# Patient Record
Sex: MALE | Race: WHITE | Employment: OTHER | URBAN - NONMETROPOLITAN AREA
[De-identification: names, ages, dates, MRNs, and addresses within clinical notes are randomized per-mention and may not be internally consistent; named-entity substitution may affect disease eponyms.]

---

## 2017-02-01 ENCOUNTER — DOCTOR'S OFFICE (OUTPATIENT)
Dept: URBAN - NONMETROPOLITAN AREA CLINIC 1 | Facility: CLINIC | Age: 67
Setting detail: OPHTHALMOLOGY
End: 2017-02-01
Payer: MEDICARE

## 2017-02-01 DIAGNOSIS — H43.812: ICD-10-CM

## 2017-02-01 DIAGNOSIS — H40.053: ICD-10-CM

## 2017-02-01 DIAGNOSIS — H26.491: ICD-10-CM

## 2017-02-01 DIAGNOSIS — Z96.1: ICD-10-CM

## 2017-02-01 DIAGNOSIS — H35.3131: ICD-10-CM

## 2017-02-01 DIAGNOSIS — H04.123: ICD-10-CM

## 2017-02-01 PROCEDURE — 92133 CPTRZD OPH DX IMG PST SGM ON: CPT | Performed by: OPHTHALMOLOGY

## 2017-02-01 PROCEDURE — 92025 CPTRIZED CORNEAL TOPOGRAPHY: CPT | Performed by: OPHTHALMOLOGY

## 2017-02-01 PROCEDURE — 92014 COMPRE OPH EXAM EST PT 1/>: CPT | Performed by: OPHTHALMOLOGY

## 2017-02-01 PROCEDURE — 76514 ECHO EXAM OF EYE THICKNESS: CPT | Performed by: OPHTHALMOLOGY

## 2017-02-01 ASSESSMENT — REFRACTION_OUTSIDERX
OS_VA2: 20/30
OS_CYLINDER: -1.25
OS_AXIS: 075
OD_AXIS: 105
OD_VA3: 20/
OD_VA1: 20/30
OS_SPHERE: +0.75
OD_CYLINDER: -1.50
OU_VA: 20/
OD_VA2: 20/30
OD_SPHERE: +1.00
OS_VA3: 20/
OS_VA1: 20/30

## 2017-02-01 ASSESSMENT — DRY EYES - PHYSICIAN NOTES
OS_GENERALCOMMENTS: KSICCA
OD_GENERALCOMMENTS: KSICCA

## 2017-02-01 ASSESSMENT — REFRACTION_MANIFEST
OS_VA1: 20/
OU_VA: 20/
OD_VA2: 20/
OD_VA3: 20/
OS_VA1: 20/
OS_VA3: 20/
OD_VA1: 20/
OS_VA2: 20/
OS_VA2: 20/
OD_VA2: 20/
OD_VA3: 20/
OS_VA3: 20/
OD_VA1: 20/
OU_VA: 20/

## 2017-02-01 ASSESSMENT — REFRACTION_AUTOREFRACTION
OD_SPHERE: +1.00
OD_AXIS: 113
OS_AXIS: 69
OD_CYLINDER: -1.25
OS_CYLINDER: -1.00
OS_SPHERE: +0.50

## 2017-02-01 ASSESSMENT — REFRACTION_CURRENTRX
OS_OVR_VA: 20/
OD_OVR_VA: 20/
OD_VPRISM_DIRECTION: SV
OD_OVR_VA: 20/
OS_VPRISM_DIRECTION: SV
OD_OVR_VA: 20/
OS_OVR_VA: 20/
OD_SPHERE: +1.00
OS_CYLINDER: -1.25
OS_SPHERE: +0.75
OD_CYLINDER: -1.50
OS_OVR_VA: 20/
OD_AXIS: 112
OS_AXIS: 74

## 2017-02-01 ASSESSMENT — PACHYMETRY
OS_CT_CORRECTION: -6
OD_CT_UM: 612
OD_CT_CORRECTION: -5
OS_CT_UM: 621

## 2017-02-01 ASSESSMENT — CONFRONTATIONAL VISUAL FIELD TEST (CVF)
OD_FINDINGS: FULL
OS_FINDINGS: FULL

## 2017-02-01 ASSESSMENT — SPHEQUIV_DERIVED
OD_SPHEQUIV: 0.375
OS_SPHEQUIV: 0

## 2017-02-01 ASSESSMENT — VISUAL ACUITY
OS_BCVA: 20/25
OD_BCVA: 20/25-1

## 2017-03-01 ENCOUNTER — DOCTOR'S OFFICE (OUTPATIENT)
Dept: URBAN - NONMETROPOLITAN AREA CLINIC 1 | Facility: CLINIC | Age: 67
Setting detail: OPHTHALMOLOGY
End: 2017-03-01
Payer: MEDICARE

## 2017-03-01 ENCOUNTER — RX ONLY (RX ONLY)
Age: 67
End: 2017-03-01

## 2017-03-01 DIAGNOSIS — Z96.1: ICD-10-CM

## 2017-03-01 DIAGNOSIS — H52.203: ICD-10-CM

## 2017-03-01 DIAGNOSIS — H53.9: ICD-10-CM

## 2017-03-01 DIAGNOSIS — H40.053: ICD-10-CM

## 2017-03-01 DIAGNOSIS — H35.3131: ICD-10-CM

## 2017-03-01 DIAGNOSIS — H04.121: ICD-10-CM

## 2017-03-01 DIAGNOSIS — H04.122: ICD-10-CM

## 2017-03-01 DIAGNOSIS — H43.812: ICD-10-CM

## 2017-03-01 PROBLEM — H26.491: Status: RESOLVED | Noted: 2017-03-01 | Resolved: 2017-03-01

## 2017-03-01 PROCEDURE — 83861 MICROFLUID ANALY TEARS: CPT | Performed by: OPHTHALMOLOGY

## 2017-03-01 PROCEDURE — 92134 CPTRZ OPH DX IMG PST SGM RTA: CPT | Performed by: OPHTHALMOLOGY

## 2017-03-01 PROCEDURE — 92012 INTRM OPH EXAM EST PATIENT: CPT | Performed by: OPHTHALMOLOGY

## 2017-03-01 ASSESSMENT — REFRACTION_OUTSIDERX
OD_SPHERE: +1.00
OS_SPHERE: +0.75
OD_VA3: 20/
OD_AXIS: 105
OU_VA: 20/
OS_CYLINDER: -1.25
OS_VA3: 20/
OS_VA2: 20/30
OD_CYLINDER: -1.50
OD_VA2: 20/30
OS_AXIS: 075
OS_VA1: 20/30
OD_VA1: 20/30

## 2017-03-01 ASSESSMENT — REFRACTION_CURRENTRX
OS_CYLINDER: -1.25
OS_AXIS: 74
OD_OVR_VA: 20/
OD_SPHERE: +1.00
OS_OVR_VA: 20/
OD_OVR_VA: 20/
OD_CYLINDER: -1.50
OS_OVR_VA: 20/
OS_VPRISM_DIRECTION: SV
OS_OVR_VA: 20/
OD_OVR_VA: 20/
OD_AXIS: 112
OS_SPHERE: +0.75
OD_VPRISM_DIRECTION: SV

## 2017-03-01 ASSESSMENT — REFRACTION_AUTOREFRACTION
OD_CYLINDER: -1.50
OS_AXIS: 062
OD_SPHERE: +1.50
OS_CYLINDER: -1.25
OD_AXIS: 103
OS_SPHERE: +1.25

## 2017-03-01 ASSESSMENT — REFRACTION_MANIFEST
OD_VA2: 20/
OS_VA3: 20/
OD_VA2: 20/
OS_VA1: 20/
OS_VA2: 20/
OS_VA1: 20/
OD_VA3: 20/
OU_VA: 20/
OS_VA3: 20/
OD_VA1: 20/
OU_VA: 20/
OS_VA2: 20/
OD_VA1: 20/
OD_VA3: 20/

## 2017-03-01 ASSESSMENT — DRY EYES - PHYSICIAN NOTES
OD_GENERALCOMMENTS: KSICCA
OS_GENERALCOMMENTS: KSICCA

## 2017-03-01 ASSESSMENT — AXIALLENGTH_DERIVED: OD_AL: 23.7135

## 2017-03-01 ASSESSMENT — SPHEQUIV_DERIVED
OD_SPHEQUIV: 0.75
OS_SPHEQUIV: 0.625

## 2017-03-01 ASSESSMENT — KERATOMETRY
OD_K1POWER_DIOPTERS: 42.00
OD_K2POWER_DIOPTERS: 42.75
METHOD_AUTO_MANUAL: AUTO
OD_AXISANGLE_DEGREES: 127

## 2017-03-01 ASSESSMENT — VISUAL ACUITY
OS_BCVA: 20/40-2
OD_BCVA: 20/70+2

## 2017-03-01 ASSESSMENT — CONFRONTATIONAL VISUAL FIELD TEST (CVF)
OD_FINDINGS: FULL
OS_FINDINGS: FULL

## 2017-03-16 ENCOUNTER — AMBUL SURGICAL CARE (OUTPATIENT)
Dept: URBAN - NONMETROPOLITAN AREA SURGERY 1 | Facility: SURGERY | Age: 67
Setting detail: OPHTHALMOLOGY
End: 2017-03-16
Payer: MEDICARE

## 2017-03-16 DIAGNOSIS — H52.221: ICD-10-CM

## 2017-03-16 PROCEDURE — 65772 CORRECTION OF ASTIGMATISM: CPT | Performed by: OPHTHALMOLOGY

## 2017-03-17 ENCOUNTER — RX ONLY (RX ONLY)
Age: 67
End: 2017-03-17

## 2017-03-17 ENCOUNTER — DOCTOR'S OFFICE (OUTPATIENT)
Dept: URBAN - NONMETROPOLITAN AREA CLINIC 1 | Facility: CLINIC | Age: 67
Setting detail: OPHTHALMOLOGY
End: 2017-03-17

## 2017-03-17 DIAGNOSIS — H52.202: ICD-10-CM

## 2017-03-17 PROCEDURE — 99024 POSTOP FOLLOW-UP VISIT: CPT | Performed by: OPTOMETRIST

## 2017-03-17 ASSESSMENT — REFRACTION_OUTSIDERX
OS_VA3: 20/
OS_CYLINDER: -1.25
OD_VA3: 20/
OS_SPHERE: +0.75
OD_VA2: 20/30
OD_VA1: 20/30
OS_VA2: 20/30
OS_AXIS: 075
OD_CYLINDER: -1.50
OD_AXIS: 105
OU_VA: 20/
OD_SPHERE: +1.00
OS_VA1: 20/30

## 2017-03-17 ASSESSMENT — AXIALLENGTH_DERIVED
OD_AL: 23.81
OS_AL: 23.8865

## 2017-03-17 ASSESSMENT — REFRACTION_AUTOREFRACTION
OD_CYLINDER: -1.25
OS_CYLINDER: -1.25
OS_SPHERE: +1.00
OD_AXIS: 106
OS_AXIS: 065
OD_SPHERE: +1.25

## 2017-03-17 ASSESSMENT — REFRACTION_MANIFEST
OD_VA2: 20/
OD_VA2: 20/
OS_VA3: 20/
OD_VA1: 20/
OU_VA: 20/
OS_VA2: 20/
OS_VA3: 20/
OS_VA2: 20/
OS_VA1: 20/
OD_VA3: 20/
OU_VA: 20/
OD_VA3: 20/
OS_VA1: 20/
OD_VA1: 20/

## 2017-03-17 ASSESSMENT — SPHEQUIV_DERIVED
OS_SPHEQUIV: 0.375
OD_SPHEQUIV: 0.625

## 2017-03-17 ASSESSMENT — KERATOMETRY
OS_AXISANGLE_DEGREES: 160
OD_AXISANGLE_DEGREES: 050
OD_K1POWER_DIOPTERS: 41.87
OS_K2POWER_DIOPTERS: 42.75
OD_K2POWER_DIOPTERS: 42.62
OS_K1POWER_DIOPTERS: 41.87
METHOD_AUTO_MANUAL: MANUAL

## 2017-03-17 ASSESSMENT — REFRACTION_CURRENTRX
OD_AXIS: 112
OD_OVR_VA: 20/
OS_CYLINDER: -1.25
OS_OVR_VA: 20/
OS_AXIS: 74
OS_SPHERE: +0.75
OD_OVR_VA: 20/
OD_OVR_VA: 20/
OD_VPRISM_DIRECTION: SV
OS_VPRISM_DIRECTION: SV
OS_OVR_VA: 20/
OS_OVR_VA: 20/
OD_CYLINDER: -1.50
OD_SPHERE: +1.00

## 2017-03-17 ASSESSMENT — DRY EYES - PHYSICIAN NOTES
OS_GENERALCOMMENTS: KSICCA
OD_GENERALCOMMENTS: KSICCA

## 2017-03-17 ASSESSMENT — VISUAL ACUITY
OD_BCVA: 20/70+2
OS_BCVA: 20/40-2

## 2017-03-23 ENCOUNTER — AMBUL SURGICAL CARE (OUTPATIENT)
Dept: URBAN - NONMETROPOLITAN AREA SURGERY 1 | Facility: SURGERY | Age: 67
Setting detail: OPHTHALMOLOGY
End: 2017-03-23
Payer: MEDICARE

## 2017-03-23 DIAGNOSIS — H52.212: ICD-10-CM

## 2017-03-23 PROCEDURE — 65772 CORRECTION OF ASTIGMATISM: CPT | Performed by: OPHTHALMOLOGY

## 2017-03-24 ENCOUNTER — DOCTOR'S OFFICE (OUTPATIENT)
Dept: URBAN - NONMETROPOLITAN AREA CLINIC 1 | Facility: CLINIC | Age: 67
Setting detail: OPHTHALMOLOGY
End: 2017-03-24
Payer: MEDICARE

## 2017-03-24 DIAGNOSIS — H52.202: ICD-10-CM

## 2017-03-24 PROCEDURE — 99024 POSTOP FOLLOW-UP VISIT: CPT | Performed by: OPHTHALMOLOGY

## 2017-03-24 ASSESSMENT — KERATOMETRY
OD_K1POWER_DIOPTERS: 41.87
OD_K2POWER_DIOPTERS: 42.62
OD_AXISANGLE_DEGREES: 050
OS_K2POWER_DIOPTERS: 42.75
METHOD_AUTO_MANUAL: MANUAL
OS_AXISANGLE_DEGREES: 160
OS_K1POWER_DIOPTERS: 41.87

## 2017-03-24 ASSESSMENT — REFRACTION_AUTOREFRACTION
OS_AXIS: 053
OD_AXIS: 109
OS_SPHERE: +1.50
OS_CYLINDER: -2.00
OD_CYLINDER: -1.50
OD_SPHERE: +1.25

## 2017-03-24 ASSESSMENT — REFRACTION_OUTSIDERX
OS_VA1: 20/30
OD_VA1: 20/30
OS_VA2: 20/30
OS_AXIS: 075
OD_VA2: 20/30
OD_SPHERE: +1.00
OD_AXIS: 105
OS_VA3: 20/
OS_CYLINDER: -1.25
OS_SPHERE: +0.75
OD_VA3: 20/
OD_CYLINDER: -1.50
OU_VA: 20/

## 2017-03-24 ASSESSMENT — AXIALLENGTH_DERIVED
OS_AL: 23.8367
OD_AL: 23.8611

## 2017-03-24 ASSESSMENT — REFRACTION_MANIFEST
OD_VA1: 20/
OD_VA3: 20/
OS_VA3: 20/
OU_VA: 20/
OS_VA1: 20/
OS_VA1: 20/
OD_VA2: 20/
OS_VA3: 20/
OD_VA1: 20/
OD_VA3: 20/
OS_VA2: 20/
OS_VA2: 20/
OU_VA: 20/
OD_VA2: 20/

## 2017-03-24 ASSESSMENT — REFRACTION_CURRENTRX
OS_VPRISM_DIRECTION: SV
OD_OVR_VA: 20/
OS_SPHERE: +0.75
OS_OVR_VA: 20/
OD_SPHERE: +1.00
OS_OVR_VA: 20/
OD_VPRISM_DIRECTION: SV
OS_CYLINDER: -1.25
OS_OVR_VA: 20/
OS_AXIS: 74
OD_CYLINDER: -1.50
OD_OVR_VA: 20/
OD_OVR_VA: 20/
OD_AXIS: 112

## 2017-03-24 ASSESSMENT — DRY EYES - PHYSICIAN NOTES
OD_GENERALCOMMENTS: KSICCA
OS_GENERALCOMMENTS: KSICCA

## 2017-03-24 ASSESSMENT — SPHEQUIV_DERIVED
OD_SPHEQUIV: 0.5
OS_SPHEQUIV: 0.5

## 2017-03-24 ASSESSMENT — VISUAL ACUITY
OS_BCVA: 20/40+2
OD_BCVA: 20/80-2

## 2017-04-05 ENCOUNTER — DOCTOR'S OFFICE (OUTPATIENT)
Dept: URBAN - NONMETROPOLITAN AREA CLINIC 1 | Facility: CLINIC | Age: 67
Setting detail: OPHTHALMOLOGY
End: 2017-04-05
Payer: MEDICARE

## 2017-04-05 DIAGNOSIS — H52.203: ICD-10-CM

## 2017-04-05 DIAGNOSIS — Z96.1: ICD-10-CM

## 2017-04-05 PROCEDURE — 99024 POSTOP FOLLOW-UP VISIT: CPT | Performed by: OPTOMETRIST

## 2017-04-05 ASSESSMENT — REFRACTION_AUTOREFRACTION
OD_SPHERE: +1.25
OD_AXIS: 117
OS_AXIS: 51
OD_CYLINDER: -1.00
OS_SPHERE: +1.50
OS_CYLINDER: -2.25

## 2017-04-05 ASSESSMENT — REFRACTION_MANIFEST
OD_VA1: 20/
OS_VA3: 20/
OS_VA2: 20/
OU_VA: 20/
OS_VA1: 20/
OD_VA2: 20/
OD_VA3: 20/

## 2017-04-05 ASSESSMENT — REFRACTION_OUTSIDERX
OS_VA1: 20/25
OS_VA3: 20/
OD_SPHERE: +0.75
OD_AXIS: 105
OD_VA2: 20/30
OS_SPHERE: +0.75
OD_ADD: +2.50
OS_VA2: 20/30
OD_SPHERE: +1.00
OU_VA: 20/25
OD_VA3: 20/
OD_VA1: 20/30
OD_CYLINDER: -1.00
OS_CYLINDER: -2.25
OD_VA2: 20/20
OS_VA3: 20/
OD_VA3: 20/
OS_VA2: 20/20
OS_SPHERE: +1.50
OS_ADD: +2.50
OD_VA1: 20/25
OD_AXIS: 120
OS_AXIS: 075
OS_AXIS: 055
OU_VA: 20/
OS_CYLINDER: -1.25
OD_CYLINDER: -1.50
OS_VA1: 20/30

## 2017-04-05 ASSESSMENT — KERATOMETRY
OD_K1POWER_DIOPTERS: 41.87
METHOD_AUTO_MANUAL: MANUAL
OS_K1POWER_DIOPTERS: 41.87
OD_K2POWER_DIOPTERS: 42.62
OS_AXISANGLE_DEGREES: 160
OS_K2POWER_DIOPTERS: 42.75
OD_AXISANGLE_DEGREES: 050

## 2017-04-05 ASSESSMENT — REFRACTION_CURRENTRX
OS_OVR_VA: 20/
OS_CYLINDER: -1.25
OD_OVR_VA: 20/
OD_CYLINDER: -1.50
OD_SPHERE: +1.00
OD_OVR_VA: 20/
OS_VPRISM_DIRECTION: SV
OS_OVR_VA: 20/
OD_AXIS: 112
OD_OVR_VA: 20/
OS_SPHERE: +0.75
OS_AXIS: 74
OD_VPRISM_DIRECTION: SV
OS_OVR_VA: 20/

## 2017-04-05 ASSESSMENT — SPHEQUIV_DERIVED
OS_SPHEQUIV: 0.375
OD_SPHEQUIV: 0.75

## 2017-04-05 ASSESSMENT — AXIALLENGTH_DERIVED
OS_AL: 23.8865
OD_AL: 23.7619

## 2017-04-05 ASSESSMENT — DRY EYES - PHYSICIAN NOTES
OS_GENERALCOMMENTS: KSICCA
OD_GENERALCOMMENTS: KSICCA

## 2017-04-05 ASSESSMENT — VISUAL ACUITY
OD_BCVA: 20/60-2
OS_BCVA: 20/30-1

## 2017-05-09 ENCOUNTER — RX ONLY (RX ONLY)
Age: 67
End: 2017-05-09

## 2017-05-09 ENCOUNTER — DOCTOR'S OFFICE (OUTPATIENT)
Dept: URBAN - NONMETROPOLITAN AREA CLINIC 1 | Facility: CLINIC | Age: 67
Setting detail: OPHTHALMOLOGY
End: 2017-05-09

## 2017-05-09 DIAGNOSIS — Z96.1: ICD-10-CM

## 2017-05-09 DIAGNOSIS — H52.203: ICD-10-CM

## 2017-05-09 PROCEDURE — 92015 DETERMINE REFRACTIVE STATE: CPT | Performed by: OPTOMETRIST

## 2017-05-09 PROCEDURE — 92310 CONTACT LENS FITTING OU: CPT | Performed by: OPTOMETRIST

## 2017-05-09 ASSESSMENT — REFRACTION_MANIFEST
OD_VA2: 20/
OD_VA3: 20/
OU_VA: 20/
OD_VA1: 20/
OS_VA3: 20/
OS_VA2: 20/
OS_VA1: 20/

## 2017-05-09 ASSESSMENT — REFRACTION_OUTSIDERX
OS_VA3: 20/
OS_CYLINDER: -2.50
OS_VA1: 20/25
OD_VA3: 20/
OS_VA1: 20/25-2
OD_CYLINDER: -1.00
OS_ADD: +2.50
OD_AXIS: 120
OD_AXIS: 120
OS_VA3: 20/
OD_VA2: 20/25-2
OU_VA: 20/
OS_VA2: 20/20
OS_SPHERE: +1.50
OD_SPHERE: +0.75
OS_VA2: 20/25-2
OD_SPHERE: +1.00
OS_CYLINDER: -2.25
OS_SPHERE: +1.50
OS_AXIS: 055
OD_VA1: 20/25-2
OD_VA1: 20/25
OD_ADD: +2.50
OD_VA2: 20/20
OU_VA: 20/25
OS_AXIS: 060
OD_CYLINDER: -1.25
OD_VA3: 20/

## 2017-05-09 ASSESSMENT — DRY EYES - PHYSICIAN NOTES
OS_GENERALCOMMENTS: KSICCA
OD_GENERALCOMMENTS: KSICCA

## 2017-05-16 ASSESSMENT — REFRACTION_AUTOREFRACTION
OD_AXIS: 127
OS_AXIS: 052
OD_SPHERE: +1.25
OS_SPHERE: +1.75
OD_CYLINDER: -0.75
OS_CYLINDER: -2.25

## 2017-05-16 ASSESSMENT — REFRACTION_CURRENTRX
OD_OVR_VA: 20/
OD_AXIS: 112
OD_SPHERE: +1.00
OD_VPRISM_DIRECTION: SV
OS_SPHERE: +0.75
OS_OVR_VA: 20/
OS_OVR_VA: 20/
OS_CYLINDER: -1.25
OS_AXIS: 74
OD_OVR_VA: 20/
OS_VPRISM_DIRECTION: SV
OD_CYLINDER: -1.50
OD_OVR_VA: 20/
OS_OVR_VA: 20/

## 2017-05-16 ASSESSMENT — KERATOMETRY
OS_AXISANGLE_DEGREES: 061
OD_AXISANGLE_DEGREES: 150
OS_K2POWER_DIOPTERS: 41.35
OS_K1POWER_DIOPTERS: 43.01
OD_K1POWER_DIOPTERS: 42.50
OD_K2POWER_DIOPTERS: 42.01
METHOD_AUTO_MANUAL: AUTO

## 2017-05-16 ASSESSMENT — AXIALLENGTH_DERIVED
OS_AL: 23.84
OD_AL: 23.71

## 2017-05-16 ASSESSMENT — SPHEQUIV_DERIVED
OS_SPHEQUIV: 0.625
OD_SPHEQUIV: 0.875

## 2017-05-16 ASSESSMENT — VISUAL ACUITY
OD_BCVA: 20/100-1
OS_BCVA: 20/40-2

## 2017-06-05 ENCOUNTER — DOCTOR'S OFFICE (OUTPATIENT)
Dept: URBAN - NONMETROPOLITAN AREA CLINIC 1 | Facility: CLINIC | Age: 67
Setting detail: OPHTHALMOLOGY
End: 2017-06-05
Payer: MEDICARE

## 2017-06-05 DIAGNOSIS — Z96.1: ICD-10-CM

## 2017-06-05 DIAGNOSIS — H52.203: ICD-10-CM

## 2017-06-05 PROCEDURE — CLFUP CONTACT LENS FOLLOW-UP: Performed by: OPTOMETRIST

## 2017-06-05 ASSESSMENT — REFRACTION_AUTOREFRACTION
OS_CYLINDER: -2.25
OD_AXIS: 127
OD_SPHERE: +1.25
OS_AXIS: 052
OS_SPHERE: +1.75
OD_CYLINDER: -0.75

## 2017-06-05 ASSESSMENT — REFRACTION_MANIFEST
OS_VA1: 20/
OD_VA1: 20/
OU_VA: 20/
OD_VA2: 20/
OD_VA3: 20/
OS_VA3: 20/
OS_VA2: 20/

## 2017-06-05 ASSESSMENT — REFRACTION_OUTSIDERX
OS_SPHERE: +1.50
OD_VA1: 20/25-2
OD_SPHERE: +1.00
OD_ADD: +2.50
OD_AXIS: 120
OD_SPHERE: +0.75
OS_VA1: 20/25-2
OD_CYLINDER: -1.00
OS_VA3: 20/
OD_CYLINDER: -1.25
OU_VA: 20/25
OS_VA3: 20/
OS_VA2: 20/25-2
OD_VA2: 20/20
OS_AXIS: 060
OS_VA2: 20/20
OS_AXIS: 055
OS_VA1: 20/25
OD_VA1: 20/25
OS_SPHERE: +1.50
OD_AXIS: 120
OU_VA: 20/
OS_CYLINDER: -2.50
OD_VA2: 20/25-2
OS_CYLINDER: -2.25
OD_VA3: 20/
OD_VA3: 20/
OS_ADD: +2.50

## 2017-06-05 ASSESSMENT — VISUAL ACUITY
OD_BCVA: 20/50-2
OS_BCVA: 20/40-2

## 2017-06-05 ASSESSMENT — REFRACTION_CURRENTRX
OS_CYLINDER: -1.25
OS_OVR_VA: 20/
OD_AXIS: 112
OD_CYLINDER: -1.50
OD_OVR_VA: 20/
OS_VPRISM_DIRECTION: SV
OD_OVR_VA: 20/
OS_OVR_VA: 20/
OD_OVR_VA: 20/
OD_SPHERE: +1.00
OD_VPRISM_DIRECTION: SV
OS_OVR_VA: 20/
OS_AXIS: 74
OS_SPHERE: +0.75

## 2017-06-05 ASSESSMENT — DRY EYES - PHYSICIAN NOTES
OS_GENERALCOMMENTS: KSICCA
OD_GENERALCOMMENTS: KSICCA

## 2017-06-05 ASSESSMENT — SPHEQUIV_DERIVED
OD_SPHEQUIV: 0.875
OS_SPHEQUIV: 0.625

## 2017-07-19 ENCOUNTER — DOCTOR'S OFFICE (OUTPATIENT)
Dept: URBAN - NONMETROPOLITAN AREA CLINIC 1 | Facility: CLINIC | Age: 67
Setting detail: OPHTHALMOLOGY
End: 2017-07-19
Payer: COMMERCIAL

## 2017-07-19 ENCOUNTER — DOCTOR'S OFFICE (OUTPATIENT)
Dept: URBAN - NONMETROPOLITAN AREA CLINIC 1 | Facility: CLINIC | Age: 67
Setting detail: OPHTHALMOLOGY
End: 2017-07-19
Payer: MEDICARE

## 2017-07-19 DIAGNOSIS — H52.203: ICD-10-CM

## 2017-07-19 DIAGNOSIS — H52.03: ICD-10-CM

## 2017-07-19 DIAGNOSIS — H35.3131: ICD-10-CM

## 2017-07-19 DIAGNOSIS — Z96.1: ICD-10-CM

## 2017-07-19 PROCEDURE — 92134 CPTRZ OPH DX IMG PST SGM RTA: CPT | Performed by: OPTOMETRIST

## 2017-07-19 PROCEDURE — 92025 CPTRIZED CORNEAL TOPOGRAPHY: CPT | Performed by: OPTOMETRIST

## 2017-07-19 PROCEDURE — 92014 COMPRE OPH EXAM EST PT 1/>: CPT | Performed by: OPTOMETRIST

## 2017-07-19 PROCEDURE — 92015 DETERMINE REFRACTIVE STATE: CPT | Performed by: OPTOMETRIST

## 2017-07-19 ASSESSMENT — REFRACTION_CURRENTRX
OS_SPHERE: +0.75
OS_OVR_VA: 20/
OD_OVR_VA: 20/
OS_OVR_VA: 20/
OD_CYLINDER: -1.50
OS_CYLINDER: -1.25
OD_OVR_VA: 20/
OD_VPRISM_DIRECTION: SV
OS_OVR_VA: 20/
OD_SPHERE: +1.00
OS_AXIS: 74
OD_AXIS: 112
OS_VPRISM_DIRECTION: SV
OD_OVR_VA: 20/

## 2017-07-19 ASSESSMENT — REFRACTION_OUTSIDERX
OS_VA3: 20/
OD_VA2: 20/25-2
OD_SPHERE: +0.75
OD_CYLINDER: -1.00
OS_AXIS: 055
OD_VA3: 20/
OD_VA1: 20/25
OS_SPHERE: +1.50
OD_VA3: 20/
OU_VA: 20/
OD_ADD: +2.50
OS_VA1: 20/25
OU_VA: 20/25
OS_VA3: 20/
OS_VA2: 20/20
OD_AXIS: 120
OD_CYLINDER: -1.25
OS_CYLINDER: -2.25
OD_AXIS: 120
OS_SPHERE: +1.50
OS_VA1: 20/25-2
OD_VA2: 20/20
OD_SPHERE: +1.25
OS_AXIS: 060
OS_ADD: +2.50
OD_VA1: 20/25-2
OS_CYLINDER: -2.25
OS_VA2: 20/25-2

## 2017-07-19 ASSESSMENT — REFRACTION_MANIFEST
OD_VA1: 20/
OD_VA3: 20/
OS_VA3: 20/
OD_VA2: 20/
OU_VA: 20/
OS_VA2: 20/
OS_VA1: 20/

## 2017-07-19 ASSESSMENT — REFRACTION_AUTOREFRACTION
OS_AXIS: 054
OS_CYLINDER: -2.00
OD_AXIS: 118
OS_SPHERE: +1.75
OD_CYLINDER: -1.25
OD_SPHERE: +1.25

## 2017-07-19 ASSESSMENT — DRY EYES - PHYSICIAN NOTES
OD_GENERALCOMMENTS: KSICCA
OS_GENERALCOMMENTS: KSICCA

## 2017-07-19 ASSESSMENT — KERATOMETRY
OD_K2POWER_DIOPTERS: 42.01
OS_AXISANGLE_DEGREES: 061
OS_K1POWER_DIOPTERS: 43.01
OD_K1POWER_DIOPTERS: 42.50
OS_K2POWER_DIOPTERS: 41.35
OD_AXISANGLE_DEGREES: 150
METHOD_AUTO_MANUAL: AUTO

## 2017-07-19 ASSESSMENT — SPHEQUIV_DERIVED
OS_SPHEQUIV: 0.75
OD_SPHEQUIV: 0.625

## 2017-07-19 ASSESSMENT — AXIALLENGTH_DERIVED
OD_AL: 23.81
OS_AL: 23.79

## 2017-07-19 ASSESSMENT — VISUAL ACUITY
OD_BCVA: 20/50
OS_BCVA: 20/30+1

## 2017-07-19 ASSESSMENT — CONFRONTATIONAL VISUAL FIELD TEST (CVF)
OS_FINDINGS: FULL
OD_FINDINGS: FULL

## 2017-07-25 ENCOUNTER — DOCTOR'S OFFICE (OUTPATIENT)
Dept: URBAN - NONMETROPOLITAN AREA CLINIC 1 | Facility: CLINIC | Age: 67
Setting detail: OPHTHALMOLOGY
End: 2017-07-25
Payer: MEDICARE

## 2017-07-25 DIAGNOSIS — H43.812: ICD-10-CM

## 2017-07-25 DIAGNOSIS — H31.002: ICD-10-CM

## 2017-07-25 DIAGNOSIS — H40.053: ICD-10-CM

## 2017-07-25 DIAGNOSIS — H43.392: ICD-10-CM

## 2017-07-25 DIAGNOSIS — H04.123: ICD-10-CM

## 2017-07-25 PROCEDURE — 92014 COMPRE OPH EXAM EST PT 1/>: CPT | Performed by: OPHTHALMOLOGY

## 2017-07-25 PROCEDURE — 92235 FLUORESCEIN ANGRPH MLTIFRAME: CPT | Performed by: OPHTHALMOLOGY

## 2017-07-25 PROCEDURE — 92225 OPHTHALMOSCOPY EXTENDED INITIAL: CPT | Performed by: OPHTHALMOLOGY

## 2017-07-25 PROCEDURE — 92250 FUNDUS PHOTOGRAPHY W/I&R: CPT | Performed by: OPHTHALMOLOGY

## 2017-07-25 ASSESSMENT — REFRACTION_OUTSIDERX
OS_VA3: 20/
OS_CYLINDER: -2.25
OD_VA2: 20/25-2
OS_VA1: 20/25-2
OD_CYLINDER: -1.00
OD_SPHERE: +0.75
OU_VA: 20/
OS_ADD: +2.50
OD_VA3: 20/
OD_AXIS: 120
OD_AXIS: 120
OD_SPHERE: +1.25
OS_AXIS: 060
OU_VA: 20/25
OD_VA1: 20/25-2
OS_VA2: 20/20
OS_VA2: 20/25-2
OD_CYLINDER: -1.25
OS_SPHERE: +1.50
OS_VA1: 20/25
OS_CYLINDER: -2.25
OS_VA3: 20/
OS_AXIS: 055
OD_VA3: 20/
OD_VA2: 20/20
OD_ADD: +2.50
OS_SPHERE: +1.50
OD_VA1: 20/25

## 2017-07-25 ASSESSMENT — REFRACTION_MANIFEST
OS_VA1: 20/
OS_VA3: 20/
OD_VA2: 20/
OD_VA1: 20/
OD_VA3: 20/
OS_VA2: 20/
OU_VA: 20/

## 2017-07-25 ASSESSMENT — REFRACTION_CURRENTRX
OS_VPRISM_DIRECTION: SV
OD_SPHERE: +1.00
OS_OVR_VA: 20/
OD_OVR_VA: 20/
OD_CYLINDER: -1.50
OS_OVR_VA: 20/
OS_SPHERE: +0.75
OD_VPRISM_DIRECTION: SV
OD_OVR_VA: 20/
OD_AXIS: 112
OS_OVR_VA: 20/
OS_CYLINDER: -1.25
OD_OVR_VA: 20/
OS_AXIS: 74

## 2017-07-25 ASSESSMENT — CONFRONTATIONAL VISUAL FIELD TEST (CVF)
OD_FINDINGS: FULL
OS_FINDINGS: FULL

## 2017-07-25 ASSESSMENT — DRY EYES - PHYSICIAN NOTES
OD_GENERALCOMMENTS: KSICCA
OS_GENERALCOMMENTS: KSICCA

## 2017-07-25 ASSESSMENT — VISUAL ACUITY
OS_BCVA: 20/25
OD_BCVA: 20/60

## 2017-07-25 ASSESSMENT — SPHEQUIV_DERIVED
OD_SPHEQUIV: 0.625
OS_SPHEQUIV: 0.75

## 2017-07-25 ASSESSMENT — REFRACTION_AUTOREFRACTION
OD_CYLINDER: -1.25
OS_AXIS: 054
OD_SPHERE: +1.25
OS_CYLINDER: -2.00
OD_AXIS: 118
OS_SPHERE: +1.75

## 2017-07-31 ENCOUNTER — OPTICAL OFFICE (OUTPATIENT)
Dept: URBAN - NONMETROPOLITAN AREA CLINIC 4 | Facility: CLINIC | Age: 67
Setting detail: OPHTHALMOLOGY
End: 2017-07-31
Payer: COMMERCIAL

## 2017-07-31 DIAGNOSIS — H52.223: ICD-10-CM

## 2017-07-31 PROCEDURE — V2104 SPHEROCYLINDR 4.00D/2.12-4D: HCPCS | Performed by: OPTOMETRIST

## 2017-07-31 PROCEDURE — V2784 LENS POLYCARB OR EQUAL: HCPCS | Performed by: OPTOMETRIST

## 2017-11-15 ENCOUNTER — DOCTOR'S OFFICE (OUTPATIENT)
Dept: URBAN - NONMETROPOLITAN AREA CLINIC 1 | Facility: CLINIC | Age: 67
Setting detail: OPHTHALMOLOGY
End: 2017-11-15
Payer: MEDICARE

## 2017-11-15 DIAGNOSIS — H40.053: ICD-10-CM

## 2017-11-15 DIAGNOSIS — H04.123: ICD-10-CM

## 2017-11-15 DIAGNOSIS — H43.392: ICD-10-CM

## 2017-11-15 DIAGNOSIS — H43.812: ICD-10-CM

## 2017-11-15 DIAGNOSIS — H31.002: ICD-10-CM

## 2017-11-15 DIAGNOSIS — Z96.1: ICD-10-CM

## 2017-11-15 PROCEDURE — 92012 INTRM OPH EXAM EST PATIENT: CPT | Performed by: OPHTHALMOLOGY

## 2017-11-15 ASSESSMENT — REFRACTION_CURRENTRX
OD_OVR_VA: 20/
OS_SPHERE: +0.75
OD_VPRISM_DIRECTION: SV
OS_OVR_VA: 20/
OD_OVR_VA: 20/
OD_AXIS: 112
OS_VPRISM_DIRECTION: SV
OS_AXIS: 74
OS_OVR_VA: 20/
OS_OVR_VA: 20/
OD_OVR_VA: 20/
OS_CYLINDER: -1.25
OD_SPHERE: +1.00
OD_CYLINDER: -1.50

## 2017-11-15 ASSESSMENT — REFRACTION_AUTOREFRACTION
OD_SPHERE: +1.25
OD_AXIS: 106
OS_CYLINDER: -2.25
OS_AXIS: 62
OD_CYLINDER: -1.50
OS_SPHERE: +1.25

## 2017-11-15 ASSESSMENT — AXIALLENGTH_DERIVED
OD_AL: 23.86
OS_AL: 24.04

## 2017-11-15 ASSESSMENT — REFRACTION_OUTSIDERX
OS_ADD: +2.50
OD_CYLINDER: -1.00
OD_VA3: 20/
OD_VA1: 20/25
OD_VA2: 20/20
OS_VA2: 20/25-2
OS_CYLINDER: -2.25
OS_AXIS: 055
OD_VA1: 20/25-2
OD_AXIS: 120
OD_VA3: 20/
OD_CYLINDER: -1.25
OU_VA: 20/25
OD_SPHERE: +0.75
OS_VA1: 20/25
OS_VA2: 20/20
OS_CYLINDER: -2.25
OS_SPHERE: +1.50
OS_VA3: 20/
OS_SPHERE: +1.50
OD_ADD: +2.50
OS_VA1: 20/25-2
OD_SPHERE: +1.25
OD_AXIS: 120
OS_VA3: 20/
OD_VA2: 20/25-2
OS_AXIS: 060
OU_VA: 20/

## 2017-11-15 ASSESSMENT — KERATOMETRY
OD_K1POWER_DIOPTERS: 42.50
OS_AXISANGLE_DEGREES: 061
METHOD_AUTO_MANUAL: AUTO
OD_K2POWER_DIOPTERS: 42.01
OD_AXISANGLE_DEGREES: 150
OS_K1POWER_DIOPTERS: 43.01
OS_K2POWER_DIOPTERS: 41.35

## 2017-11-15 ASSESSMENT — REFRACTION_MANIFEST
OS_VA3: 20/
OU_VA: 20/
OD_VA3: 20/
OS_VA2: 20/
OS_VA1: 20/
OD_VA2: 20/
OD_VA1: 20/

## 2017-11-15 ASSESSMENT — DRY EYES - PHYSICIAN NOTES
OD_GENERALCOMMENTS: KSICCA
OS_GENERALCOMMENTS: KSICCA

## 2017-11-15 ASSESSMENT — SPHEQUIV_DERIVED
OS_SPHEQUIV: 0.125
OD_SPHEQUIV: 0.5

## 2017-11-15 ASSESSMENT — VISUAL ACUITY
OD_BCVA: 20/25
OS_BCVA: 20/20-1

## 2018-05-22 ENCOUNTER — DOCTOR'S OFFICE (OUTPATIENT)
Dept: URBAN - NONMETROPOLITAN AREA CLINIC 1 | Facility: CLINIC | Age: 68
Setting detail: OPHTHALMOLOGY
End: 2018-05-22
Payer: MEDICARE

## 2018-05-22 DIAGNOSIS — Z96.1: ICD-10-CM

## 2018-05-22 DIAGNOSIS — H31.002: ICD-10-CM

## 2018-05-22 DIAGNOSIS — H43.812: ICD-10-CM

## 2018-05-22 DIAGNOSIS — H52.203: ICD-10-CM

## 2018-05-22 DIAGNOSIS — H43.392: ICD-10-CM

## 2018-05-22 DIAGNOSIS — H40.053: ICD-10-CM

## 2018-05-22 DIAGNOSIS — H04.123: ICD-10-CM

## 2018-05-22 PROCEDURE — 92083 EXTENDED VISUAL FIELD XM: CPT | Performed by: OPHTHALMOLOGY

## 2018-05-22 PROCEDURE — 92134 CPTRZ OPH DX IMG PST SGM RTA: CPT | Performed by: OPHTHALMOLOGY

## 2018-05-22 PROCEDURE — 92014 COMPRE OPH EXAM EST PT 1/>: CPT | Performed by: OPHTHALMOLOGY

## 2018-05-22 PROCEDURE — 92025 CPTRIZED CORNEAL TOPOGRAPHY: CPT | Performed by: OPHTHALMOLOGY

## 2018-05-22 ASSESSMENT — REFRACTION_OUTSIDERX
OS_VA3: 20/
OU_VA: 20/
OD_CYLINDER: -1.25
OD_AXIS: 120
OD_CYLINDER: -1.00
OS_AXIS: 060
OS_VA1: 20/25
OS_ADD: +2.50
OD_VA2: 20/25-2
OS_VA2: 20/20
OD_VA1: 20/25-2
OD_AXIS: 120
OS_AXIS: 055
OD_VA1: 20/25
OD_VA3: 20/
OD_ADD: +2.50
OS_CYLINDER: -2.25
OD_SPHERE: +1.25
OD_SPHERE: +0.75
OS_SPHERE: +1.50
OS_CYLINDER: -2.25
OD_VA3: 20/
OS_VA3: 20/
OS_SPHERE: +1.50
OU_VA: 20/25
OD_VA2: 20/20
OS_VA2: 20/25-2
OS_VA1: 20/25-2

## 2018-05-22 ASSESSMENT — DRY EYES - PHYSICIAN NOTES
OS_GENERALCOMMENTS: KSICCA
OD_GENERALCOMMENTS: KSICCA

## 2018-05-22 ASSESSMENT — REFRACTION_CURRENTRX
OS_OVR_VA: 20/
OS_VPRISM_DIRECTION: SV
OD_VPRISM_DIRECTION: SV
OS_OVR_VA: 20/
OS_SPHERE: +0.75
OD_CYLINDER: -1.50
OS_OVR_VA: 20/
OS_AXIS: 74
OD_OVR_VA: 20/
OS_CYLINDER: -1.25
OD_OVR_VA: 20/
OD_OVR_VA: 20/
OD_SPHERE: +1.00
OD_AXIS: 112

## 2018-05-22 ASSESSMENT — REFRACTION_AUTOREFRACTION
OS_SPHERE: +1.25
OD_CYLINDER: -1.50
OD_SPHERE: +1.25
OS_AXIS: 62
OS_CYLINDER: -2.25
OD_AXIS: 106

## 2018-05-22 ASSESSMENT — REFRACTION_MANIFEST
OS_VA3: 20/
OS_VA1: 20/
OD_VA2: 20/
OU_VA: 20/
OD_VA3: 20/
OD_VA1: 20/
OS_VA2: 20/

## 2018-05-22 ASSESSMENT — VISUAL ACUITY
OD_BCVA: 20/25
OS_BCVA: 20/20-1

## 2018-05-22 ASSESSMENT — SPHEQUIV_DERIVED
OS_SPHEQUIV: 0.125
OD_SPHEQUIV: 0.5

## 2018-05-22 ASSESSMENT — CONFRONTATIONAL VISUAL FIELD TEST (CVF)
OS_FINDINGS: FULL
OD_FINDINGS: FULL

## 2018-05-30 ENCOUNTER — Encounter (OUTPATIENT)
Dept: URBAN - NONMETROPOLITAN AREA CLINIC 1 | Facility: CLINIC | Age: 68
Setting detail: OPHTHALMOLOGY
End: 2018-05-30
Payer: MEDICARE

## 2019-05-22 ENCOUNTER — DOCTOR'S OFFICE (OUTPATIENT)
Dept: URBAN - NONMETROPOLITAN AREA CLINIC 1 | Facility: CLINIC | Age: 69
Setting detail: OPHTHALMOLOGY
End: 2019-05-22
Payer: MEDICARE

## 2019-05-22 DIAGNOSIS — Z96.1: ICD-10-CM

## 2019-05-22 DIAGNOSIS — H04.123: ICD-10-CM

## 2019-05-22 DIAGNOSIS — H35.3131: ICD-10-CM

## 2019-05-22 DIAGNOSIS — H31.002: ICD-10-CM

## 2019-05-22 DIAGNOSIS — H43.812: ICD-10-CM

## 2019-05-22 DIAGNOSIS — H40.053: ICD-10-CM

## 2019-05-22 DIAGNOSIS — H43.392: ICD-10-CM

## 2019-05-22 PROCEDURE — 92133 CPTRZD OPH DX IMG PST SGM ON: CPT | Performed by: OPHTHALMOLOGY

## 2019-05-22 PROCEDURE — 92014 COMPRE OPH EXAM EST PT 1/>: CPT | Performed by: OPHTHALMOLOGY

## 2019-05-22 ASSESSMENT — REFRACTION_MANIFEST
OS_ADD: +2.50
OD_AXIS: 120
OD_VA1: 20/25
OD_ADD: +2.50
OS_VA1: 20/25
OS_VA2: 20/20
OS_VA2: 20/25-2
OS_CYLINDER: -2.25
OU_VA: 20/25
OD_SPHERE: +1.25
OD_VA3: 20/
OD_VA2: 20/25-2
OS_AXIS: 055
OS_VA3: 20/
OD_VA1: 20/25-2
OD_CYLINDER: -1.25
OS_AXIS: 060
OD_SPHERE: +0.75
OS_CYLINDER: -2.25
OS_VA1: 20/25-2
OD_VA2: 20/20
OD_VA3: 20/
OD_AXIS: 120
OS_VA3: 20/
OD_CYLINDER: -1.00
OS_SPHERE: +1.50
OU_VA: 20/
OS_SPHERE: +1.50

## 2019-05-22 ASSESSMENT — REFRACTION_CURRENTRX
OS_OVR_VA: 20/
OD_OVR_VA: 20/
OD_SPHERE: +1.00
OD_VPRISM_DIRECTION: SV
OS_AXIS: 74
OD_OVR_VA: 20/
OD_OVR_VA: 20/
OS_SPHERE: +0.75
OS_VPRISM_DIRECTION: SV
OS_CYLINDER: -1.25
OD_AXIS: 112
OD_CYLINDER: -1.50
OS_OVR_VA: 20/
OS_OVR_VA: 20/

## 2019-05-22 ASSESSMENT — AXIALLENGTH_DERIVED
OD_AL: 23.61
OD_AL: 23.81
OS_AL: 23.94
OS_AL: 23.74
OD_AL: 23.96
OS_AL: 23.94

## 2019-05-22 ASSESSMENT — KERATOMETRY
OD_K1POWER_DIOPTERS: 42.50
OD_AXISANGLE_DEGREES: 150
METHOD_AUTO_MANUAL: AUTO
OS_K1POWER_DIOPTERS: 43.01
OS_AXISANGLE_DEGREES: 061
OS_K2POWER_DIOPTERS: 41.35
OD_K2POWER_DIOPTERS: 42.01

## 2019-05-22 ASSESSMENT — SPHEQUIV_DERIVED
OD_SPHEQUIV: 0.25
OD_SPHEQUIV: 0.625
OS_SPHEQUIV: 0.875
OS_SPHEQUIV: 0.375
OS_SPHEQUIV: 0.375
OD_SPHEQUIV: 1.125

## 2019-05-22 ASSESSMENT — REFRACTION_AUTOREFRACTION
OD_SPHERE: +1.75
OD_CYLINDER: -1.25
OS_AXIS: 055
OS_CYLINDER: -2.75
OD_AXIS: 138
OS_SPHERE: +2.25

## 2019-05-22 ASSESSMENT — DRY EYES - PHYSICIAN NOTES
OD_GENERALCOMMENTS: KSICCA
OS_GENERALCOMMENTS: KSICCA

## 2019-05-22 ASSESSMENT — CONFRONTATIONAL VISUAL FIELD TEST (CVF)
OS_FINDINGS: FULL
OD_FINDINGS: FULL

## 2019-05-22 ASSESSMENT — VISUAL ACUITY
OS_BCVA: 20/25+2
OD_BCVA: 20/30+1

## 2019-06-04 ENCOUNTER — OPTICAL OFFICE (OUTPATIENT)
Dept: URBAN - NONMETROPOLITAN AREA CLINIC 4 | Facility: CLINIC | Age: 69
Setting detail: OPHTHALMOLOGY
End: 2019-06-04
Payer: COMMERCIAL

## 2019-06-04 ENCOUNTER — DOCTOR'S OFFICE (OUTPATIENT)
Dept: URBAN - NONMETROPOLITAN AREA CLINIC 1 | Facility: CLINIC | Age: 69
Setting detail: OPHTHALMOLOGY
End: 2019-06-04
Payer: COMMERCIAL

## 2019-06-04 DIAGNOSIS — H52.223: ICD-10-CM

## 2019-06-04 DIAGNOSIS — H52.03: ICD-10-CM

## 2019-06-04 PROCEDURE — V2104 SPHEROCYLINDR 4.00D/2.12-4D: HCPCS | Performed by: OPTOMETRIST

## 2019-06-04 PROCEDURE — V2799 MISC VISION ITEM OR SERVICE: HCPCS | Performed by: OPTOMETRIST

## 2019-06-04 PROCEDURE — V2784 LENS POLYCARB OR EQUAL: HCPCS | Performed by: OPTOMETRIST

## 2019-06-04 PROCEDURE — V2103 SPHEROCYLINDR 4.00D/12-2.00D: HCPCS | Performed by: OPTOMETRIST

## 2019-06-04 PROCEDURE — V2025 EYEGLASSES DELUX FRAMES: HCPCS | Performed by: OPTOMETRIST

## 2019-06-04 PROCEDURE — 92015 DETERMINE REFRACTIVE STATE: CPT | Performed by: OPTOMETRIST

## 2019-06-04 PROCEDURE — V2020 VISION SVCS FRAMES PURCHASES: HCPCS | Performed by: OPTOMETRIST

## 2019-06-04 ASSESSMENT — REFRACTION_MANIFEST
OS_VA1: 20/25
OS_VA3: 20/
OD_VA2: 20/20
OU_VA: 20/25
OS_SPHERE: +2.00
OS_AXIS: 062
OU_VA: 20/
OS_ADD: +2.50
OD_AXIS: 120
OS_AXIS: 055
OS_SPHERE: +1.50
OD_SPHERE: +0.75
OS_VA2: 20/20
OD_CYLINDER: -1.50
OD_ADD: +2.50
OS_CYLINDER: -2.25
OS_CYLINDER: -2.75
OS_VA2: 20/25-2
OD_VA3: 20/
OD_AXIS: 120
OD_CYLINDER: -1.00
OS_VA1: 20/25-2
OD_VA3: 20/
OD_VA2: 20/25-2
OD_VA1: 20/25
OD_SPHERE: +1.25
OD_VA1: 20/25-2
OS_VA3: 20/

## 2019-06-04 ASSESSMENT — KERATOMETRY
OS_K1POWER_DIOPTERS: 43.01
OD_AXISANGLE_DEGREES: 150
METHOD_AUTO_MANUAL: AUTO
OS_AXISANGLE_DEGREES: 061
OD_K1POWER_DIOPTERS: 42.50
OD_K2POWER_DIOPTERS: 42.01
OS_K2POWER_DIOPTERS: 41.35

## 2019-06-04 ASSESSMENT — REFRACTION_CURRENTRX
OS_OVR_VA: 20/
OS_OVR_VA: 20/
OD_AXIS: 125
OS_AXIS: 054
OS_CYLINDER: -2.25
OS_OVR_VA: 20/
OD_VPRISM_DIRECTION: SV
OD_OVR_VA: 20/
OD_CYLINDER: -1.25
OD_SPHERE: +1.25
OD_OVR_VA: 20/
OS_SPHERE: +1.50
OD_OVR_VA: 20/
OS_VPRISM_DIRECTION: SV

## 2019-06-04 ASSESSMENT — SPHEQUIV_DERIVED
OS_SPHEQUIV: 0.625
OD_SPHEQUIV: 1.25
OS_SPHEQUIV: 0.375
OS_SPHEQUIV: 0.625
OD_SPHEQUIV: 0.5
OD_SPHEQUIV: 0.25

## 2019-06-04 ASSESSMENT — REFRACTION_AUTOREFRACTION
OS_CYLINDER: -2.75
OD_AXIS: 128
OS_AXIS: 062
OD_SPHERE: +2.00
OD_CYLINDER: -1.50
OS_SPHERE: +2.00

## 2019-06-04 ASSESSMENT — AXIALLENGTH_DERIVED
OD_AL: 23.86
OS_AL: 23.84
OS_AL: 23.94
OD_AL: 23.96
OS_AL: 23.84
OD_AL: 23.56

## 2019-06-04 ASSESSMENT — VISUAL ACUITY
OD_BCVA: 20/30-1
OS_BCVA: 20/25-1

## 2019-12-30 ENCOUNTER — APPOINTMENT (EMERGENCY)
Dept: RADIOLOGY | Facility: HOSPITAL | Age: 69
End: 2019-12-30
Payer: COMMERCIAL

## 2019-12-30 ENCOUNTER — HOSPITAL ENCOUNTER (EMERGENCY)
Facility: HOSPITAL | Age: 69
Discharge: HOME | End: 2019-12-30
Admitting: EMERGENCY MEDICINE
Payer: COMMERCIAL

## 2019-12-30 VITALS
WEIGHT: 165.34 LBS | SYSTOLIC BLOOD PRESSURE: 165 MMHG | TEMPERATURE: 98.1 F | HEIGHT: 71 IN | DIASTOLIC BLOOD PRESSURE: 84 MMHG | RESPIRATION RATE: 16 BRPM | HEART RATE: 75 BPM | OXYGEN SATURATION: 98 % | BODY MASS INDEX: 23.15 KG/M2

## 2019-12-30 DIAGNOSIS — M25.531 ACUTE PAIN OF RIGHT WRIST: Primary | ICD-10-CM

## 2019-12-30 PROCEDURE — 73110 X-RAY EXAM OF WRIST: CPT

## 2019-12-30 PROCEDURE — 99283 EMERGENCY DEPT VISIT LOW MDM: CPT

## 2019-12-30 PROCEDURE — 99284 EMERGENCY DEPT VISIT MOD MDM: CPT | Performed by: EMERGENCY MEDICINE

## 2019-12-30 RX ORDER — AMLODIPINE BESYLATE 10 MG/1
20 TABLET ORAL DAILY
COMMUNITY

## 2019-12-30 RX ORDER — ASPIRIN 81 MG/1
81 TABLET ORAL DAILY
COMMUNITY

## 2019-12-30 RX ORDER — SIMVASTATIN 40 MG
40 TABLET ORAL DAILY
COMMUNITY

## 2019-12-30 NOTE — LETTER
[address]  [facility] [address]  Dept: [phone]    December 30, 2019     Patient: Marc Bass   YOB: 1950   Date of Visit: 12/30/2019       To Whom it May Concern:    Marc Bass was seen in the emergency department on 12/30/2019  He has a right wrist injury that requires him to wear a wrist brace  Please keep him on light or alternate duty until his symptoms improve or he is cleared by another medical provider  If you have any questions or concerns, please don't hesitate to call           Sincerely,          Joanne Milton PA-C

## 2019-12-31 NOTE — DISCHARGE INSTRUCTIONS
Please follow up with the orthopedist for further evaluation and treatment of your symptoms  A referral was placed and you should expect a phone call to schedule this appointment  Please use the wrist splint to help with immobilization  Continue other conservative treatment measures such as rest, ice, compression, elevation  You may use Tylenol for pain    I recommend warm compresses as well  Follow-up with your family doctor as needed  Return to the emergency department for any new, worsening, or concerning symptoms

## 2019-12-31 NOTE — ED ATTENDING ATTESTATION
12/30/2019  IWilber MD, saw and evaluated the patient  I have discussed the patient with the resident/non-physician practitioner and agree with the resident's/non-physician practitioner's findings, Plan of Care, and MDM as documented in the resident's/non-physician practitioner's note, except where noted  All available labs and Radiology studies were reviewed  I was present for key portions of any procedure(s) performed by the resident/non-physician practitioner and I was immediately available to provide assistance  At this point I agree with the current assessment done in the Emergency Department  I have conducted an independent evaluation of this patient a history and physical is as follows:    ED Course     Complains of swelling to the right breast   No known trauma  History of previous fracture with plate and screws and  No fevers or chills  On exam patient is awake alert  Right wrist shows a small swollen area questionable bruised area to the distal radius  Radial pulses 2+  There is no surrounding erythema or warmth or fluctuance  The right wrist is full range of motion      Critical Care Time  Procedures

## 2019-12-31 NOTE — ED NOTES
Splint applied by Tray Lopez   Pt is discharged and waiting for a copy of the images       Pepe Lebron RN  12/30/19 [id number]

## 2019-12-31 NOTE — ED PROVIDER NOTES
History  Chief Complaint   Patient presents with    Wrist Pain     Pt broke his R wrist and has surgery on it approx 16 years ago, today he said he started with sudden pain and noticed a lump on it which looked the same as when he first broke it  Pt denies any trauma to area     69-year-old male with a history of hypertension presents to the emergency department for evaluation of pain and swelling to his right wrist   The patient reports that around 5:00 p m  Today he saw the knows the small localized area of swelling on the dorsal side of his right wrist   He denies any injuries or falls, and is unsure how this may have developed  He has no history of similar issues  The patient does however report history of a wrist fracture that occurred approximately 16 years ago  He had surgery and a metal plate with screws placed at the fracture site  He has had no complications since that time, however states that this small area of swelling reminds him of similar symptoms he was experiencing prior to being diagnosed with the fracture  The patient otherwise denies any numbness or tingling in his hands, weakness, fevers, chills, night sweats, chest pain, shortness of breath, or any other acute complaints  Prior to Admission Medications   Prescriptions Last Dose Informant Patient Reported? Taking? Garlic 516 MG CAPS   Yes Yes   Sig: Take 450 mg by mouth daily   OMEPRAZOLE PO   Yes Yes   Sig: Take 20 mg by mouth daily   amLODIPine (NORVASC) 10 mg tablet  Self Yes Yes   Sig: Take 20 mg by mouth daily   aspirin (ASPIR-LOW) 81 mg EC tablet   Yes Yes   Sig: Take 81 mg by mouth daily   simvastatin (ZOCOR) 40 mg tablet   Yes Yes   Sig: Take 40 mg by mouth daily      Facility-Administered Medications: None       Past Medical History:   Diagnosis Date    Hypertension        Past Surgical History:   Procedure Laterality Date    CATARACT EXTRACTION         No family history on file    I have reviewed and agree with the history as documented  Social History     Tobacco Use    Smoking status: Former Smoker     Types: Cigars    Smokeless tobacco: Never Used   Substance Use Topics    Alcohol use: Not Currently     Alcohol/week: 6 0 standard drinks     Types: 6 Cans of beer per week    Drug use: Not Currently        Review of Systems   Constitutional: Negative for chills and fever  HENT: Negative for congestion and sore throat  Eyes: Negative for photophobia and visual disturbance  Respiratory: Negative for cough and shortness of breath  Cardiovascular: Negative for chest pain and palpitations  Gastrointestinal: Negative for abdominal pain, nausea and vomiting  Genitourinary: Negative for difficulty urinating, dysuria and hematuria  Musculoskeletal: Positive for arthralgias  Negative for back pain  Skin: Negative for rash and wound  Neurological: Negative for light-headedness and headaches  All other systems reviewed and are negative  Physical Exam  Physical Exam   Constitutional: He appears well-developed and well-nourished  He appears distressed (Mild distress)  HENT:   Head: Normocephalic and atraumatic  Mouth/Throat: Oropharynx is clear and moist    Eyes: Pupils are equal, round, and reactive to light  EOM are normal    Neck: Normal range of motion  Neck supple  Cardiovascular: Normal rate and regular rhythm  No murmur heard  Pulmonary/Chest: Effort normal and breath sounds normal  He has no wheezes  He has no rhonchi  He has no rales  Abdominal: Soft  Normal appearance and bowel sounds are normal  There is no tenderness  Musculoskeletal: He exhibits no tenderness or deformity  There is a small cystic like area of swelling on the dorsal radial aspect of the right distal forearm  It has a somewhat ecchymotic color, and it does have some fluctuance, however it does not appear infectious, there is no overlying erythema or drainage    The swelling is localized to this 1 location is approximately 1 5 x 1 cm  Neurological: He is alert  No cranial nerve deficit  Skin: Skin is warm and dry  Capillary refill takes less than 2 seconds  Vital Signs  ED Triage Vitals [12/30/19 1831]   Temperature Pulse Respirations Blood Pressure SpO2   98 1 °F (36 7 °C) 85 16 (!) 231/92 98 %      Temp Source Heart Rate Source Patient Position - Orthostatic VS BP Location FiO2 (%)   Temporal Monitor Lying Right arm --      Pain Score       5           Vitals:    12/30/19 1831   BP: (!) 231/92   Pulse: 85   Patient Position - Orthostatic VS: Lying         Visual Acuity      ED Medications  Medications - No data to display    Diagnostic Studies  Results Reviewed     None                 XR wrist 3+ views RIGHT   ED Interpretation by Jose Thornton MD (12/30 7200)   NAD      Final Result by Delvis Trent MD (12/30 1959)      No acute osseous abnormality  Distal radial fixation hardware without evidence of hardware failure  Workstation performed: [id number]                    Procedures  Procedures         ED Course  ED Course as of Dec 30 2001   Mon Dec 30, 2019   235 58-year-old male presents with the above HPI  He does have a small localized area of swelling that is somewhat fluctuant, but does not appear infectious  It does seem fluid-filled and is possibly a cyst or hematoma  1937 X-ray was ordered, and we are waiting for the official results  The patient does have a metal plate over the radius  There is no obvious fracture or issue with the hardware  2000 X-rays are negative  Discussed these results with the patient  At this time, no drainage or incision is indicated  We recommended the patient follow up with orthopedist, for which a referral was placed  We also recommended immobilization and the patient was given a wrist splint  We discussed conservative treatment options and he was also given strict return precautions under worsening symptoms    Given agreement with this plan and expressed understanding  All questions were answered the patient department stable and improved condition with good return precautions                                  MDM  Number of Diagnoses or Management Options  Acute pain of right wrist: new and requires workup     Amount and/or Complexity of Data Reviewed  Tests in the radiology section of CPT®: ordered and reviewed  Discussion of test results with the performing providers: yes  Decide to obtain previous medical records or to obtain history from someone other than the patient: yes  Obtain history from someone other than the patient: yes  Review and summarize past medical records: yes  Discuss the patient with other providers: yes  Independent visualization of images, tracings, or specimens: yes    Risk of Complications, Morbidity, and/or Mortality  Presenting problems: minimal  Diagnostic procedures: minimal  Management options: minimal    Patient Progress  Patient progress: stable        Disposition  Final diagnoses:   Acute pain of right wrist     Time reflects when diagnosis was documented in both MDM as applicable and the Disposition within this note     Time User Action Codes Description Comment    12/30/2019  7:42 PM Leo Simon Add [M25 [id number]] Acute pain of right wrist       ED Disposition     ED Disposition Condition Date/Time Comment    Discharge Stable Mon Dec 30, 2019  7:42 PM Tamar Sharp discharge to home/self care              Follow-up Information    None         Patient's Medications   Discharge Prescriptions    No medications on file         ED Provider  Electronically Signed by           Bety Bentley PA-C  12/30/19 2002

## 2020-10-09 ENCOUNTER — DOCTOR'S OFFICE (OUTPATIENT)
Dept: URBAN - NONMETROPOLITAN AREA CLINIC 1 | Facility: CLINIC | Age: 70
Setting detail: OPHTHALMOLOGY
End: 2020-10-09
Payer: MEDICARE

## 2020-10-09 VITALS — HEIGHT: 60 IN

## 2020-10-09 DIAGNOSIS — H35.3131: ICD-10-CM

## 2020-10-09 DIAGNOSIS — H43.392: ICD-10-CM

## 2020-10-09 DIAGNOSIS — H04.123: ICD-10-CM

## 2020-10-09 DIAGNOSIS — H43.812: ICD-10-CM

## 2020-10-09 DIAGNOSIS — H31.002: ICD-10-CM

## 2020-10-09 DIAGNOSIS — H40.053: ICD-10-CM

## 2020-10-09 PROCEDURE — 92134 CPTRZ OPH DX IMG PST SGM RTA: CPT | Performed by: OPHTHALMOLOGY

## 2020-10-09 PROCEDURE — 76514 ECHO EXAM OF EYE THICKNESS: CPT | Performed by: OPHTHALMOLOGY

## 2020-10-09 PROCEDURE — 99214 OFFICE O/P EST MOD 30 MIN: CPT | Performed by: OPHTHALMOLOGY

## 2020-10-09 PROCEDURE — 92083 EXTENDED VISUAL FIELD XM: CPT | Performed by: OPHTHALMOLOGY

## 2020-10-09 ASSESSMENT — DRY EYES - PHYSICIAN NOTES
OS_GENERALCOMMENTS: KSICCA
OD_GENERALCOMMENTS: KSICCA

## 2020-10-09 ASSESSMENT — REFRACTION_AUTOREFRACTION
OS_CYLINDER: -2.75
OD_CYLINDER: -1.50
OD_SPHERE: +2.00
OD_AXIS: 128
OS_AXIS: 062
OS_SPHERE: +2.00

## 2020-10-09 ASSESSMENT — AXIALLENGTH_DERIVED
OS_AL: 23.94
OD_AL: 23.96
OD_AL: 23.56
OD_AL: 23.86
OS_AL: 23.84
OS_AL: 23.84

## 2020-10-09 ASSESSMENT — VISUAL ACUITY
OS_BCVA: 20/25
OD_BCVA: 20/25-2

## 2020-10-09 ASSESSMENT — KERATOMETRY
OS_K1POWER_DIOPTERS: 43.01
METHOD_AUTO_MANUAL: AUTO
OS_AXISANGLE_DEGREES: 061
OD_K1POWER_DIOPTERS: 42.50
OS_K2POWER_DIOPTERS: 41.35
OD_AXISANGLE_DEGREES: 150
OD_K2POWER_DIOPTERS: 42.01

## 2020-10-09 ASSESSMENT — REFRACTION_MANIFEST
OD_VA2: 20/20
OS_VA1: 20/25
OS_VA1: 20/25-2
OD_VA1: 20/25
OD_VA2: 20/25-2
OD_ADD: +2.50
OS_SPHERE: +2.00
OD_AXIS: 120
OD_CYLINDER: -1.00
OD_CYLINDER: -1.50
OS_SPHERE: +1.50
OD_SPHERE: +1.25
OU_VA: 20/25
OS_AXIS: 055
OS_VA2: 20/20
OD_VA1: 20/25-2
OS_CYLINDER: -2.75
OS_AXIS: 062
OD_AXIS: 120
OS_CYLINDER: -2.25
OS_ADD: +2.50
OS_VA2: 20/25-2
OD_SPHERE: +0.75

## 2020-10-09 ASSESSMENT — REFRACTION_CURRENTRX
OD_VPRISM_DIRECTION: SV
OS_CYLINDER: -2.25
OS_SPHERE: +1.50
OD_AXIS: 125
OS_OVR_VA: 20/
OD_OVR_VA: 20/
OS_AXIS: 054
OD_CYLINDER: -1.25
OD_SPHERE: +1.25
OS_VPRISM_DIRECTION: SV

## 2020-10-09 ASSESSMENT — PACHYMETRY
OS_CT_CORRECTION: -6
OD_CT_CORRECTION: -5
OD_CT_UM: 612
OS_CT_UM: 621

## 2020-10-09 ASSESSMENT — SPHEQUIV_DERIVED
OD_SPHEQUIV: 1.25
OD_SPHEQUIV: 0.25
OS_SPHEQUIV: 0.375
OD_SPHEQUIV: 0.5
OS_SPHEQUIV: 0.625
OS_SPHEQUIV: 0.625

## 2020-10-09 ASSESSMENT — CONFRONTATIONAL VISUAL FIELD TEST (CVF)
OD_FINDINGS: FULL
OS_FINDINGS: FULL

## 2022-02-04 ENCOUNTER — DOCTOR'S OFFICE (OUTPATIENT)
Dept: URBAN - NONMETROPOLITAN AREA CLINIC 1 | Facility: CLINIC | Age: 72
Setting detail: OPHTHALMOLOGY
End: 2022-02-04
Payer: MEDICARE

## 2022-02-04 ENCOUNTER — DOCTOR'S OFFICE (OUTPATIENT)
Dept: URBAN - NONMETROPOLITAN AREA CLINIC 1 | Facility: CLINIC | Age: 72
Setting detail: OPHTHALMOLOGY
End: 2022-02-04
Payer: COMMERCIAL

## 2022-02-04 ENCOUNTER — OPTICAL OFFICE (OUTPATIENT)
Dept: URBAN - NONMETROPOLITAN AREA CLINIC 4 | Facility: CLINIC | Age: 72
Setting detail: OPHTHALMOLOGY
End: 2022-02-04
Payer: COMMERCIAL

## 2022-02-04 VITALS — HEIGHT: 60 IN

## 2022-02-04 DIAGNOSIS — H52.223: ICD-10-CM

## 2022-02-04 DIAGNOSIS — H35.3131: ICD-10-CM

## 2022-02-04 DIAGNOSIS — H52.203: ICD-10-CM

## 2022-02-04 PROBLEM — H40.053 OCULAR HYPERTENSION; BOTH EYES: Status: ACTIVE | Noted: 2017-04-05

## 2022-02-04 PROBLEM — H04.122 DRY EYE; RIGHT EYE, LEFT EYE: Status: ACTIVE | Noted: 2017-04-05

## 2022-02-04 PROBLEM — H53.9 VISUAL DISTURBANCE, UNSPECIFIED: Status: RESOLVED | Noted: 2017-04-05 | Resolved: 2022-02-04

## 2022-02-04 PROBLEM — Z96.1: Status: ACTIVE | Noted: 2017-04-05

## 2022-02-04 PROBLEM — H31.002 CHORIORETINAL SCARS; LEFT EYE: Status: ACTIVE | Noted: 2017-07-25

## 2022-02-04 PROBLEM — H04.121 DRY EYE; RIGHT EYE, LEFT EYE: Status: ACTIVE | Noted: 2017-04-05

## 2022-02-04 PROBLEM — H43.812 POSTERIOR VITREOUS DETACHMENT; LEFT EYE: Status: ACTIVE | Noted: 2017-04-05

## 2022-02-04 PROBLEM — H43.392 VITREOUS DEBRIS; LEFT EYE: Status: ACTIVE | Noted: 2017-07-25

## 2022-02-04 PROCEDURE — V2103 SPHEROCYLINDR 4.00D/12-2.00D: HCPCS | Performed by: OPTOMETRIST

## 2022-02-04 PROCEDURE — V2020 VISION SVCS FRAMES PURCHASES: HCPCS | Performed by: OPTOMETRIST

## 2022-02-04 PROCEDURE — 92014 COMPRE OPH EXAM EST PT 1/>: CPT | Performed by: OPTOMETRIST

## 2022-02-04 PROCEDURE — V2104 SPHEROCYLINDR 4.00D/2.12-4D: HCPCS | Performed by: OPTOMETRIST

## 2022-02-04 PROCEDURE — 92134 CPTRZ OPH DX IMG PST SGM RTA: CPT | Performed by: OPTOMETRIST

## 2022-02-04 PROCEDURE — V2025 EYEGLASSES DELUX FRAMES: HCPCS | Performed by: OPTOMETRIST

## 2022-02-04 PROCEDURE — V2750 ANTI-REFLECTIVE COATING: HCPCS | Performed by: OPTOMETRIST

## 2022-02-04 PROCEDURE — V2784 LENS POLYCARB OR EQUAL: HCPCS | Performed by: OPTOMETRIST

## 2022-02-04 ASSESSMENT — REFRACTION_MANIFEST
OS_AXIS: 055
OS_SPHERE: +1.75
OD_ADD: +2.50
OS_AXIS: 065
OS_VA1: 20/25-2
OS_ADD: +2.50
OD_CYLINDER: -1.75
OS_CYLINDER: -2.25
OS_ADD: +1.25
OD_VA1: 20/25-2
OD_ADD: +1.25
OS_VA2: 20/25-2
OS_VA2: 20/20
OD_AXIS: 120
OS_SPHERE: +1.50
OD_VA2: 20/20
OD_AXIS: 120
OD_VA1: 20/25
OS_VA1: 20/25
OD_CYLINDER: -1.00
OD_VA2: 20/25-2
OU_VA: 20/25
OS_CYLINDER: -2.75
OD_SPHERE: +1.50
OD_SPHERE: +0.75

## 2022-02-04 ASSESSMENT — REFRACTION_AUTOREFRACTION
OS_SPHERE: +1.75
OD_AXIS: 120
OD_CYLINDER: -1.25
OD_SPHERE: +1.50
OS_CYLINDER: -2.50
OS_AXIS: 065

## 2022-02-04 ASSESSMENT — PACHYMETRY
OS_CT_CORRECTION: -6
OS_CT_UM: 621
OD_CT_UM: 612
OD_CT_CORRECTION: -5

## 2022-02-04 ASSESSMENT — KERATOMETRY
OS_K1POWER_DIOPTERS: 40.75
OD_AXISANGLE_DEGREES: 061
OS_AXISANGLE_DEGREES: 152
METHOD_AUTO_MANUAL: AUTO
OD_K2POWER_DIOPTERS: 46.25
OS_K2POWER_DIOPTERS: 43.00
OD_K1POWER_DIOPTERS: 42.25

## 2022-02-04 ASSESSMENT — REFRACTION_CURRENTRX
OD_AXIS: 119
OD_CYLINDER: -1.25
OD_SPHERE: +1.25
OS_OVR_VA: 20/
OS_AXIS: 056
OS_VPRISM_DIRECTION: SV
OD_OVR_VA: 20/
OS_SPHERE: +2.00
OS_CYLINDER: -2.75
OD_VPRISM_DIRECTION: SV

## 2022-02-04 ASSESSMENT — SPHEQUIV_DERIVED
OD_SPHEQUIV: 0.625
OD_SPHEQUIV: 0.25
OS_SPHEQUIV: 0.375
OD_SPHEQUIV: 0.875
OS_SPHEQUIV: 0.5
OS_SPHEQUIV: 0.375

## 2022-02-04 ASSESSMENT — CONFRONTATIONAL VISUAL FIELD TEST (CVF)
OD_FINDINGS: FULL
OS_FINDINGS: FULL

## 2022-02-04 ASSESSMENT — AXIALLENGTH_DERIVED
OS_AL: 24.0515
OD_AL: 23.0841
OD_AL: 22.9912
OD_AL: 23.2248
OS_AL: 24.0009
OS_AL: 24.0515

## 2022-02-04 ASSESSMENT — VISUAL ACUITY
OD_BCVA: 20/25
OS_BCVA: 20/30

## 2022-02-04 ASSESSMENT — TONOMETRY
OD_IOP_MMHG: 14
OS_IOP_MMHG: 14

## 2022-02-04 ASSESSMENT — DRY EYES - PHYSICIAN NOTES
OD_GENERALCOMMENTS: KSICCA
OS_GENERALCOMMENTS: KSICCA

## 2025-02-03 ENCOUNTER — OPTICAL OFFICE (OUTPATIENT)
Dept: URBAN - NONMETROPOLITAN AREA CLINIC 4 | Facility: CLINIC | Age: 75
Setting detail: OPHTHALMOLOGY
End: 2025-02-03
Payer: COMMERCIAL

## 2025-02-03 ENCOUNTER — DOCTOR'S OFFICE (OUTPATIENT)
Dept: URBAN - NONMETROPOLITAN AREA CLINIC 1 | Facility: CLINIC | Age: 75
Setting detail: OPHTHALMOLOGY
End: 2025-02-03
Payer: MEDICARE

## 2025-02-03 VITALS — HEIGHT: 60 IN

## 2025-02-03 DIAGNOSIS — H40.053: ICD-10-CM

## 2025-02-03 DIAGNOSIS — H52.203: ICD-10-CM

## 2025-02-03 DIAGNOSIS — H35.3131: ICD-10-CM

## 2025-02-03 DIAGNOSIS — H31.002: ICD-10-CM

## 2025-02-03 DIAGNOSIS — H43.392: ICD-10-CM

## 2025-02-03 PROCEDURE — V2784 LENS POLYCARB OR EQUAL: HCPCS | Performed by: OPTOMETRIST

## 2025-02-03 PROCEDURE — V2204 LENS SPHCY BIFOCAL 4.00D/2.1: HCPCS | Mod: LT | Performed by: OPTOMETRIST

## 2025-02-03 PROCEDURE — 92014 COMPRE OPH EXAM EST PT 1/>: CPT | Performed by: OPTOMETRIST

## 2025-02-03 PROCEDURE — V2204 LENS SPHCY BIFOCAL 4.00D/2.1: HCPCS | Performed by: OPTOMETRIST

## 2025-02-03 PROCEDURE — 92134 CPTRZ OPH DX IMG PST SGM RTA: CPT | Performed by: OPTOMETRIST

## 2025-02-03 PROCEDURE — V2020 VISION SVCS FRAMES PURCHASES: HCPCS | Performed by: OPTOMETRIST

## 2025-02-03 PROCEDURE — V2025 EYEGLASSES DELUX FRAMES: HCPCS | Performed by: OPTOMETRIST

## 2025-02-03 PROCEDURE — V2784 LENS POLYCARB OR EQUAL: HCPCS | Mod: LT | Performed by: OPTOMETRIST

## 2025-02-03 ASSESSMENT — REFRACTION_MANIFEST
OD_AXIS: 120
OS_AXIS: 065
OS_CYLINDER: -2.75
OS_VA2: 20/20
OD_SPHERE: +0.75
OS_SPHERE: +1.75
OD_ADD: +1.25
OD_SPHERE: +2.50
OS_CYLINDER: -2.25
OD_VA2: 20/25-2
OS_ADD: +1.25
OS_VA1: 20/25
OS_ADD: +2.50
OD_VA1: 20/30-2
OU_VA: 20/25
OD_CYLINDER: -2.25
OD_VA1: 20/25
OD_CYLINDER: -1.00
OD_ADD: +2.50
OS_VA2: 20/25-2
OS_SPHERE: +1.50
OS_VA1: 20/30
OD_VA2: 20/20
OD_AXIS: 120
OS_AXIS: 055

## 2025-02-03 ASSESSMENT — CONFRONTATIONAL VISUAL FIELD TEST (CVF)
OD_FINDINGS: FULL
OS_FINDINGS: FULL

## 2025-02-03 ASSESSMENT — KERATOMETRY
OD_K2POWER_DIOPTERS: 46.25
OD_AXISANGLE_DEGREES: 061
OD_K1POWER_DIOPTERS: 42.25
OS_K1POWER_DIOPTERS: 40.75
OS_AXISANGLE_DEGREES: 152
METHOD_AUTO_MANUAL: AUTO
OS_K2POWER_DIOPTERS: 43.00

## 2025-02-03 ASSESSMENT — PACHYMETRY
OS_CT_UM: 621
OD_CT_CORRECTION: -5
OS_CT_CORRECTION: -6
OD_CT_UM: 612

## 2025-02-03 ASSESSMENT — REFRACTION_AUTOREFRACTION
OD_SPHERE: +2.50
OS_AXIS: 064
OD_CYLINDER: -2.25
OD_AXIS: 119
OS_SPHERE: +1.75
OS_CYLINDER: -2.75

## 2025-02-03 ASSESSMENT — REFRACTION_CURRENTRX
OS_CYLINDER: -2.50
OS_SPHERE: +1.75
OD_VPRISM_DIRECTION: SV
OD_AXIS: 123
OD_SPHERE: +1.25
OS_AXIS: 065
OS_VPRISM_DIRECTION: SV
OS_OVR_VA: 20/
OD_CYLINDER: -1.25
OD_OVR_VA: 20/

## 2025-02-03 ASSESSMENT — DRY EYES - PHYSICIAN NOTES
OD_GENERALCOMMENTS: KSICCA
OS_GENERALCOMMENTS: KSICCA

## 2025-02-03 ASSESSMENT — VISUAL ACUITY
OS_BCVA: 20/40-1
OD_BCVA: 20/30

## 2025-02-03 ASSESSMENT — TONOMETRY
OD_IOP_MMHG: 16
OS_IOP_MMHG: 17